# Patient Record
(demographics unavailable — no encounter records)

---

## 2024-12-21 NOTE — PHYSICAL EXAM
[Appropriately responsive] : appropriately responsive [Alert] : alert [No Acute Distress] : no acute distress [No Lymphadenopathy] : no lymphadenopathy [Regular Rate Rhythm] : regular rate rhythm [No Murmurs] : no murmurs [Clear to Auscultation B/L] : clear to auscultation bilaterally [Soft] : soft [Non-tender] : non-tender [Non-distended] : non-distended [No HSM] : No HSM [No Lesions] : no lesions [No Mass] : no mass [Oriented x3] : oriented x3 [Examination Of The Breasts] : a normal appearance [No Masses] : no breast masses were palpable [Vulvar Atrophy] : vulvar atrophy [Labia Minora] : normal [Labia Majora] : normal [Atrophy] : atrophy [Normal] : normal [Uterine Adnexae] : normal [No Tenderness] : no tenderness [Nl Sphincter Tone] : normal sphincter tone [FreeTextEntry5] : Pap done [FreeTextEntry9] : Hemoccult negative

## 2024-12-21 NOTE — HISTORY OF PRESENT ILLNESS
[FreeTextEntry1] : Patient is a 64-year-old female who presents for a routine annual gynecologic examination.  Patient has no complaints.  Patient uses Detrol LA 4 mg generic for OAB.  Patient also uses estradiol vaginal cream for vaginal dryness and also uses sildenafil vulvar cream for climax disorder.

## 2024-12-21 NOTE — DISCUSSION/SUMMARY
[FreeTextEntry1] : Impression: OAB, vulvovaginal atrophy, otherwise normal GYN exam  Continue Detrol LA 4 mg generic and estradiol vaginal cream and sildenafil vulvar cream  Recommendations: Self breast exam, mammography annually, calcium and vitamin D supplementation regular exercise  Follow-up in 1 year